# Patient Record
Sex: MALE | Race: WHITE | Employment: FULL TIME | URBAN - METROPOLITAN AREA
[De-identification: names, ages, dates, MRNs, and addresses within clinical notes are randomized per-mention and may not be internally consistent; named-entity substitution may affect disease eponyms.]

---

## 2019-06-15 ENCOUNTER — APPOINTMENT (OUTPATIENT)
Dept: GENERAL RADIOLOGY | Facility: CLINIC | Age: 54
End: 2019-06-15
Payer: COMMERCIAL

## 2019-06-15 ENCOUNTER — HOSPITAL ENCOUNTER (EMERGENCY)
Facility: CLINIC | Age: 54
Discharge: HOME | End: 2019-06-15
Attending: EMERGENCY MEDICINE
Payer: COMMERCIAL

## 2019-06-15 VITALS
RESPIRATION RATE: 18 BRPM | WEIGHT: 220 LBS | SYSTOLIC BLOOD PRESSURE: 128 MMHG | DIASTOLIC BLOOD PRESSURE: 78 MMHG | HEART RATE: 64 BPM | HEIGHT: 71 IN | BODY MASS INDEX: 30.8 KG/M2 | TEMPERATURE: 98.4 F | OXYGEN SATURATION: 94 %

## 2019-06-15 DIAGNOSIS — R05.9 COUGH: Primary | ICD-10-CM

## 2019-06-15 PROCEDURE — 71046 X-RAY EXAM CHEST 2 VIEWS: CPT

## 2019-06-15 PROCEDURE — 6370000000 HC RX 637 (ALT 250 FOR IP): Performed by: EMERGENCY MEDICINE

## 2019-06-15 PROCEDURE — 6360000002 HC RX W HCPCS: Performed by: EMERGENCY MEDICINE

## 2019-06-15 PROCEDURE — 99283 EMERGENCY DEPT VISIT LOW MDM: CPT

## 2019-06-15 RX ORDER — PREDNISONE 50 MG/1
50 TABLET ORAL DAILY
Qty: 5 TABLET | Refills: 0 | Status: SHIPPED | OUTPATIENT
Start: 2019-06-15 | End: 2019-06-20

## 2019-06-15 RX ORDER — ALBUTEROL SULFATE 90 UG/1
2 AEROSOL, METERED RESPIRATORY (INHALATION) 4 TIMES DAILY
Status: DISCONTINUED | OUTPATIENT
Start: 2019-06-15 | End: 2019-06-15

## 2019-06-15 RX ORDER — ALBUTEROL SULFATE 2.5 MG/3ML
2.5 SOLUTION RESPIRATORY (INHALATION) ONCE
Status: COMPLETED | OUTPATIENT
Start: 2019-06-15 | End: 2019-06-15

## 2019-06-15 RX ORDER — OMEPRAZOLE 20 MG/1
40 CAPSULE, DELAYED RELEASE ORAL DAILY
COMMUNITY

## 2019-06-15 RX ORDER — ALBUTEROL SULFATE 90 UG/1
2 AEROSOL, METERED RESPIRATORY (INHALATION) ONCE
Status: COMPLETED | OUTPATIENT
Start: 2019-06-15 | End: 2019-06-15

## 2019-06-15 RX ORDER — BENZONATATE 100 MG/1
100 CAPSULE ORAL 3 TIMES DAILY PRN
Qty: 30 CAPSULE | Refills: 0 | Status: SHIPPED | OUTPATIENT
Start: 2019-06-15 | End: 2019-06-22

## 2019-06-15 RX ADMIN — ALBUTEROL SULFATE 2.5 MG: 2.5 SOLUTION RESPIRATORY (INHALATION) at 12:35

## 2019-06-15 RX ADMIN — ALBUTEROL SULFATE 2 PUFF: 90 AEROSOL, METERED RESPIRATORY (INHALATION) at 13:31

## 2019-06-15 ASSESSMENT — PAIN DESCRIPTION - FREQUENCY: FREQUENCY: CONTINUOUS

## 2019-06-15 ASSESSMENT — PAIN SCALES - GENERAL: PAINLEVEL_OUTOF10: 5

## 2019-06-15 ASSESSMENT — PAIN DESCRIPTION - LOCATION: LOCATION: HEAD

## 2019-06-15 ASSESSMENT — PAIN DESCRIPTION - PAIN TYPE: TYPE: ACUTE PAIN

## 2019-06-15 NOTE — ED NOTES
Pt presents to ED for c/o cough x 1 month. Pt states he has seen Dr. Humza Duron (GI) for this issue. He has has a EGD that showed barretts esophagus. Pt states he has been coughing all day and night. He has been taking mucinex DM without relief. Pt denies fever, SOB or chest pain. Does c/o headache \"from coughing\". Vitals and assessment obtained. Comfort offered. Pt also states he had pneumonia in December.             Michael Sahu RN  06/15/19 9653

## 2019-06-15 NOTE — ED PROVIDER NOTES
that he does not use drugs. PHYSICAL EXAM     INITIAL VITALS:  height is 5' 11\" (1.803 m) and weight is 99.8 kg (220 lb). His oral temperature is 98.4 °F (36.9 °C). His blood pressure is 129/86 and his pulse is 96. His respiration is 18 and oxygen saturation is 95%. Physical Exam   Constitutional: He is oriented to person, place, and time. He appears well-developed and well-nourished. Patient is coughing frequently. It is harsh and dry. HENT:   Head: Normocephalic and atraumatic. Mouth/Throat: Oropharynx is clear and moist.   Eyes: Pupils are equal, round, and reactive to light. Conjunctivae and EOM are normal.   Neck: Normal range of motion. Neck supple. No tracheal deviation present. Cardiovascular: Normal rate, regular rhythm and intact distal pulses. Pulmonary/Chest: Effort normal and breath sounds normal.   There is a slightly prolonged expiratory phase. Abdominal: Soft. Bowel sounds are normal. He exhibits no distension. There is no tenderness. Musculoskeletal: Normal range of motion. He exhibits no edema or tenderness. Neurological: He is alert and oriented to person, place, and time. Skin: Skin is warm and dry. No rash noted. Psychiatric: He has a normal mood and affect. His behavior is normal. Judgment and thought content normal.   Vitals reviewed. DIFFERENTIAL DIAGNOSIS/ MDM:   With a slightly prolonged expiratory phase, I am worried about bronchospasm. I have ordered an albuterol aerosol and of ordered chest x-ray to investigate for pulmonary abnormalities. DIAGNOSTIC RESULTS     EKG:  None    RADIOLOGY:   Xr Chest Standard (2 Vw)    Result Date: 6/15/2019  EXAMINATION: TWO XRAY VIEWS OF THE CHEST 6/15/2019 12:26 pm COMPARISON: None.  HISTORY: ORDERING SYSTEM PROVIDED HISTORY: cough TECHNOLOGIST PROVIDED HISTORY: cough Ordering Physician Provided Reason for Exam: pt c/o cough x 1 month Acuity: Acute Type of Exam: Initial Relevant Medical/Surgical History: collins's esophagus FINDINGS: The heart size is within normal limits. The pulmonary vasculature is also within normal limits. No acute infiltrates are seen. The costophrenic angles are sharp bilaterally. No pneumothoraces are noted. 1. No active pulmonary disease. LABS:  No results found for this visit on 06/15/19. EMERGENCY DEPARTMENT COURSE:     Thepatient was given the following medications:  Orders Placed This Encounter   Medications    albuterol (PROVENTIL) nebulizer solution 2.5 mg    benzonatate (TESSALON PERLES) 100 MG capsule     Sig: Take 1 capsule by mouth 3 times daily as needed for Cough     Dispense:  30 capsule     Refill:  0    albuterol sulfate  (90 Base) MCG/ACT inhaler 2 puff    predniSONE (DELTASONE) 50 MG tablet     Sig: Take 1 tablet by mouth daily for 5 days     Dispense:  5 tablet     Refill:  0        Vitals:    Vitals:    06/15/19 1201   BP: 129/86   Pulse: 96   Resp: 18   Temp: 98.4 °F (36.9 °C)   TempSrc: Oral   SpO2: 95%   Weight: 99.8 kg (220 lb)   Height: 5' 11\" (1.803 m)     -------------------------  BP: 129/86, Temp: 98.4 °F (36.9 °C), Pulse: 96, Resp: 18      Re-evaluation Notes  Patient has better aeration after aerosol treatment. He reports no significant improvement but he sounds better to me and no longer has a prolonged expiratory phase. I will place him on steroids as well as write Tessalon Perles. I had the nursing staff here teach and dispense an albuterol MDI with spacer. I do think that he does not have any improvement in the near future the pulmonary follow-up is appropriate. CONSULTS:  None    CRITICAL CARE:   None    PROCEDURES:  None    FINAL IMPRESSION      1.  Cough          DISPOSITION/PLAN   DISPOSITION Decision To Discharge 06/15/2019 01:15:32 PM      Condition on Disposition  good    PATIENT REFERRED TO:  Jamie Valdivia, [address] [place] [address] [address] [place] [address]  [phone]    Schedule an appointment as soon as possible for a visit in 2 days      Merle Garcia MD  [address]  [phone]    Schedule an appointment as soon as possible for a visit in 2 days        DISCHARGE MEDICATIONS:  [unfilled]    (Please note that portions of this note were completed with a voice recognitionprogram.  Efforts were made to edit the dictations but occasionally words are mis-transcribed.)    Lucy Perez MD, F.A.C.E.P, F.A.A.E.M  Emergency Physician Attending          Lucy Perez MD  06/15/19 5924

## 2019-06-15 NOTE — ED NOTES
Jennifer Myers MD at bedside updating pt on results and plan of care.          Michael Sahu RN  06/15/19 6855

## 2021-11-03 ENCOUNTER — HOSPITAL ENCOUNTER (OUTPATIENT)
Dept: PULMONOLOGY | Age: 56
Discharge: HOME | End: 2021-11-03
Payer: COMMERCIAL

## 2021-11-03 DIAGNOSIS — R05.9 COUGH: ICD-10-CM

## 2021-11-03 PROCEDURE — 94726 PLETHYSMOGRAPHY LUNG VOLUMES: CPT

## 2021-11-03 PROCEDURE — 94640 AIRWAY INHALATION TREATMENT: CPT

## 2021-11-03 PROCEDURE — 94060 EVALUATION OF WHEEZING: CPT

## 2021-11-03 PROCEDURE — 94729 DIFFUSING CAPACITY: CPT

## 2021-11-03 PROCEDURE — 94664 DEMO&/EVAL PT USE INHALER: CPT

## 2021-11-17 ENCOUNTER — HOSPITAL ENCOUNTER (OUTPATIENT)
Dept: GENERAL RADIOLOGY | Age: 56
Discharge: HOME | End: 2021-11-19

## 2021-11-17 ENCOUNTER — HOSPITAL ENCOUNTER (OUTPATIENT)
Age: 56
Discharge: HOME | End: 2021-11-17

## 2021-11-17 DIAGNOSIS — R52 PAIN: ICD-10-CM

## 2021-11-17 PROCEDURE — 73502 X-RAY EXAM HIP UNI 2-3 VIEWS: CPT

## 2021-11-23 ENCOUNTER — HOSPITAL ENCOUNTER (OUTPATIENT)
Dept: PHYSICAL THERAPY | Age: 56
Setting detail: THERAPIES SERIES
Discharge: HOME | End: 2021-11-23
Payer: COMMERCIAL

## 2021-11-23 PROCEDURE — 97162 PT EVAL MOD COMPLEX 30 MIN: CPT

## 2021-11-23 PROCEDURE — 97110 THERAPEUTIC EXERCISES: CPT

## 2021-11-23 NOTE — CONSULTS
[x] [facility]. 97.  [address]  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 Next day severe pain L ant hip, thigh, unable to put sock on, saw amanuel Bee working normal duty. Pt was put on steroids and taken off work for 4 days and pain went away. 1st day back to work, Pt drove 8 hours, had to use L LE for clutch and symptoms returned. Pain increases w/bending leg (at hip), donning shoe/socks, standing, walking, steps and driving stick shift (has to use L LE on clutch), up to 10/10. Pt has difficulty getting dressed, has to sit down, has difficulty lifting L leg up, has to use UE to lift leg. Pain decreases w/sitting down and prescription steroids. Pt has tried ice and heat without relief. Pt reports pain will decrease as working then pain returns that night. When pain is bad Pt has to do 1 step at a time. Pt has to use R leg to get in and out of truck. PMHx: [] Unremarkable [] Diabetes [] HTN  [] Pacemaker   [] MI/Heart Problems [] Cancer [] Arthritis [x] Other: Anne's espophagus; GERD, peptic ulcer, hernia repair               [x] Refer to full medical chart  In EPIC       Comorbidities:   [] Obesity [] Dialysis  [x] N/A   [] Asthma/COPD [] Dementia [] Other:   [] Stroke [] Sleep apnea [] Other:   [] Vascular disease [] Rheumatic disease [] Other:     Tests: [x] X-Ray: [] MRI:  [] Other:  From  L hip Xray Report   Impression   1. No acute osseous abnormality of the left hip.    2. Moderate to severe left hip osteoarthritis.           Medications: [x] Refer to full medical record [] None [] Other:  Allergies:      [x] Refer to full medical record [x] None [] Other:    Function:  Hand Dominance  [x] Right  [] Left  Patient lives with: Significant other   In what type of home []  One story   [x] Two story-bathroom, bed 2nd floor   [] Split level   Number of stairs to enter 5   Employer [employer]    Job Status []  Normal duty   [x] Light duty   [] Off due to condition    []  Retired   [] Not employed   [] Disability  [] Other:  [x]  Return to work: light duty to end 12/1   Work activities/duties Light duty so just driving, automatic; 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, going up and down ramp, lifting up to 55 lb cases large amounts at a time       ADL/IADL Previous level of function Current level of function Who currently assists the patient with task   Bathing  [x] Independent  [] Assist [x] Independent  [] Assist    Dress/grooming [x] Independent  [] Assist [x] Independent  [] Assist    Transfer/mobility [x] Independent  [] Assist [x] Independent  [] Assist    Feeding [x] Independent  [] Assist [x] Independent  [] Assist    Toileting [x] Independent  [] Assist [x] Independent  [] Assist    Driving [x] Independent  [] Assist [x] Independent  [] Assist    Housekeeping [x] Independent  [] Assist [] Independent  [x] Assist Shares    Grocery shop/meal prep [x] Independent  [] Assist [] Independent  [x] Assist Shares cooking, Pt doesn't not grocery shopping     Gait Prior level of function Current level of function    [x] Independent  [] Assist [x] Independent  [] Assist   Device: [x] Independent [x] Independent    [] Straight Cane [] Quad cane [] Straight Cane [] Quad cane    [] Standard walker [] Rolling walker   [] 4 wheeled walker [] Standard walker [] Rolling walker   [] 4 wheeled walker    [] Wheelchair [] Wheelchair     Pain:  [x] Yes  [] No Location: L hip  Pain Rating: (0-10 scale) 8/10  Pain altered Tx:  [x] Yes  [] No  Action:    Symptoms:  [] Improving [] Worsening [x] Same    Sleep: [] OK    [x] Disturbed-due to esophageal issues on sides, has to sleep on back, this is difficulty w/hip     Objective:    ROM  ° A/P STRENGTH TESTS (+/-) Left Right Not Tested    Left Right Left Right Ant. Drawer   []   Hip Flex 9-54°p 128° 2-p 4+ Post. Drawer   []   Ext --  3+p 3+ Lachmans   []   ER     Valgus Stress   []   IR     Varus Stress   []   ABD   3-p  Marys   []   ADD     Apleys Comp.   []   Knee Flex 126°p 131° 4- 4 Apleys Dist.   []   Ext 22°p 7° 4-p 5 Hip Scouring + - []   Ankle DF   5 4+ PIEDADs + - []   PF     Piriformis + - []   INV     Yanets   []   EVER     Talor Tilt   []        Pat-Fem Grind   []        LTR - + min pain    p=pain      OBSERVATION No Deficit Deficit Not Tested Comments   Posture       Forward Head [] [x] []    Rounded Shoulders [] [x] []    Leg Length Discrp [] [] [x]    Slumped Sitting [x] [] []    Palpation [x] [] [] No tenderness to palpation   Sensation [] [] [x]    Edema [] [] [x]    Neurological [] [] [x]    Patellar Mobility [] [] [x]    Patellar Orientation [] [] [x]    Gait [] [x] [] Analysis: stiff, antalgic, decreased step length L           Functional Test: LEFS Score: 68% functionally impaired     Comments:    Assessment:  Patient would benefit from skilled physical therapy services in order to: derease L hip pain, increase ROM, increase strength L hip, knee, and improve tolerance to putting on shoes and socks and walking. Problems:    [x] ? Pain: L hip, thigh 8/10 walking, up to 10/10  [x] ? ROM: L hip, L knee  [x] ? Strength:  L hip, L knee  [x] ? Function: LEFS 68% loss of LE function   [] Other:       STG: (to be met in 9 treatments)  ? Pain: L hip, thigh 4/10 walking, 7/10 at worst  ? ROM: L hip flex 0-90°, L knee ext 12°  ? Strength: L hip flex, abd 3+/5, ext 4-/5; L knee 4/5  ?  Function:LEFS 50% loss of LE function    Pt reprot improved tolerance to putting on shoes and socks and walking  Patient to be independent with home exercise program as demonstrated by performance with correct form without cues. Patient goals: \"Stop pain\"    Rehab Potential:  [x] Good  [] Fair  [] Poor   Suggested Professional Referral:  [x] No  [] Yes:  Barriers to Goal Achievement:  [x] No  [] Yes:  Domestic Concerns:  [x] No  [] Yes:    Pt. Education:  [x] Plans/Goals, Risks/Benefits discussed  [x] Home exercise program    Method of Education: [x] Verbal  [x] Demo  [x] Written  Comprehension of Education:  [x] Verbalizes understanding. [] Demonstrates understanding. [] Needs Review. [] Demonstrates/verbalizes understanding of HEP/Ed previously given. Access Code: [facility]  URL: [email or web address]  Date: 11/23/2021  Prepared by: Joseline Memory    Exercises  Lying Prone - 3-5 x daily - 7 x weekly - 10 minutes hold  Lying Prone with 1 Pillow - 3-5 x daily - 5 x weekly - 10 min hold  Prone Gluteal Sets - 3 x daily - 7 x weekly - 20 reps - 5 seconds hold  Prone Knee Flexion - 3 x daily - 7 x weekly - 20 reps      Treatment Plan:  [x] Therapeutic Exercise   26483  [x] Iontophoresis: 4 mg/mL Dexamethasone Sodium Phosphate  mAmin  92999   [x] Therapeutic Activity  39284 [] Vasopneumatic cold with compression  11439    [x] Gait Training   98018 [x] Ultrasound   65789   [] Neuromuscular Re-education  89425 [x] Electrical Stimulation Unattended  57437   [x] Manual Therapy  00116 [] Electrical Stimulation Attended  73763   [x] Instruction in HEP  [] Lumbar/Cervical Traction  17729   [] Aquatic Therapy   13436 [x] Cold/hotpack    [] Massage   87352      [] Dry Needling, 1 or 2 muscles  52803   [] Biofeedback, first 15 minutes   06199  [] Biofeedback, additional 15 minutes   20620 [] Dry Needling, 3 or more muscles  21272     []  Medication allergies reviewed for use of    Dexamethasone Sodium Phosphate 4mg/ml     with iontophoresis treatments. Pt is not allergic. Frequency:  3 x/week for 9 visits        Todays Treatment:  Modalities:   Precautions:  Exercises: L hip, thigh  Exercise Reps/ Time Weight/ Level Comments         Prone lie  5 min  Educated in doing 5-10 min 3-5x per day   Glut sets  5x 5sec Painful ant L thigh   HS curls  10x           Other:    Specific Instructions for next treatment: begin Scifit (focus on hip ext), gentle quad stretch, gentle hip flexor and abd stretch,  prone hip ext; focus on hip ext, abd ex; ok for modalities for symptom control       Evaluation Complexity:  History (Personal factors, comorbidities) [] 0 [] 1-2 [] 3+   Exam (limitations, restrictions) [] 1-2 [] 3 [] 4+   Clinical presentation (progression) [] Stable [] Evolving  [] Unstable   Decision Making [] Low [] Moderate [] High    [] Low Complexity [] Moderate Complexity [] High Complexity       Treatment Charges: Mins Units Time In/Out   [x] Evaluation       []  Low       [x]  Moderate       []  High 40 1 5804-8264   []  Modalities        [x]  Ther Exercise 11 1 9413-8055   []  Neuromuscular Re-ed      []  Gait Training      []  Manual Therapy      []  Ther Activities      []  Aquatics      []  Vasocompression      []  Cervical Traction      []  Other      Total Treatment time 51 min          TOTAL TREATMENT TIME: 51 min    Time in:1400   Time SUZANNE:1963    Electronically signed by: Rosanna Jack PT          Physician Signature:________________________________Date:__________________  By signing above or cosigning this note, I have reviewed this plan of care and certify a need for medically necessary rehabilitation services.      *PLEASE SIGN ABOVE AND FAX BACK ALL PAGES*

## 2021-11-24 ENCOUNTER — HOSPITAL ENCOUNTER (OUTPATIENT)
Dept: PHYSICAL THERAPY | Age: 56
Setting detail: THERAPIES SERIES
Discharge: HOME | End: 2021-11-24
Payer: COMMERCIAL

## 2021-11-24 PROCEDURE — 97110 THERAPEUTIC EXERCISES: CPT

## 2021-11-30 ENCOUNTER — HOSPITAL ENCOUNTER (OUTPATIENT)
Dept: PHYSICAL THERAPY | Age: 56
Setting detail: THERAPIES SERIES
Discharge: HOME | End: 2021-11-30
Payer: COMMERCIAL

## 2021-11-30 NOTE — FLOWSHEET NOTE
[x] [facility] ([facility]) [facility] &  Therapy  [address]    [phone]  F: [phone]   [] [address]  [facility]   [address]  P: [phone]  F: [phone]  [] [address] &  Therapy  [address]  P: [phone]  F: [phone] [] [address]  P: [phone]  F: [phone]  [] [address]  [facility]   [address]   [place]: [phone]  F: [phone]   [] [place] [address] [facility] [address]  [place]: [phone]   F: [phone]     Physical Therapy Cancel/No Show note    Date: 2021  Patient: Charlie Trevizo  : 1965  MRN: [id number]    Cancels/No Shows to date:     For today's appointment patient:    [x]  Cancelled    [] Rescheduled appointment    [] No-show     Reason given by patient:    []  Patient ill    []  Conflicting appointment    [] No transportation      [x] Conflict with work    [] No reason given    [] Weather related    [] [id number]    [] Other:      Comments:        [x] Next appointment was confirmed    Electronically signed by: Terry Starkey PTA